# Patient Record
Sex: MALE | Race: WHITE | NOT HISPANIC OR LATINO | URBAN - METROPOLITAN AREA
[De-identification: names, ages, dates, MRNs, and addresses within clinical notes are randomized per-mention and may not be internally consistent; named-entity substitution may affect disease eponyms.]

---

## 2018-08-23 NOTE — PATIENT DISCUSSION
REVIEWED OPTION OF CONSULT AT  WITH DR OWENS - RELUCTANT TO RECOM SURGERY AS [facility] 20[facility]20 - IF HE WANTS TO PROCEED WITH CONSULT HE IS TO LET US KNOW.

## 2020-02-06 ENCOUNTER — IMPORTED ENCOUNTER (OUTPATIENT)
Dept: URBAN - METROPOLITAN AREA CLINIC 31 | Facility: CLINIC | Age: 80
End: 2020-02-06

## 2020-02-06 PROBLEM — H25.13: Noted: 2020-02-06

## 2020-02-06 PROBLEM — H43.812: Noted: 2020-02-06

## 2020-02-06 PROBLEM — H04.223: Noted: 2020-02-06

## 2020-02-06 PROCEDURE — 68801 DILATE TEAR DUCT OPENING: CPT

## 2020-02-06 PROCEDURE — 99204 OFFICE O/P NEW MOD 45 MIN: CPT

## 2020-02-06 NOTE — PATIENT DISCUSSION
1.  Epiphora OU - The patient is complaining of excess lacrimation of the right eye. Because it is moderate to severe nasolacrimal duct dilation and irrigation was performed today. Consent was obtained. This diagnostic test will determine whether the nasolacrimal duct is blocked as the etiology for the tearing. If so pt will be referred for [facility] evaluation. Ectropion including medial cicatricial. Needs skin graft - from upper eyelids. Schedule 2 months after cataract surgery2. Discussed the risks benefits alternatives and limitations of cataract surgery including infection bleeding loss of vision retinal tears detachment. The patient stated a full understanding and a desire to proceed with the procedure in both eyes. Refractive options were reviewed. Patient has elected to be optimized for distance vision in both eyes. The patient will still need glasses for reading and to possibly fine tune distance vision. OD/OS3.  PVD OS: Patient was cautioned to call our office immediately if they experience a substantial change in their symptoms such as an increase in floaters persistent flashes loss of visual field (may appear as a shadow or a curtain) or decrease in visual acuity as these may indicate a retinal tear or detachment.   If this is a new problem patient will need to return for re-examination  as determined by the physician

## 2020-05-22 ENCOUNTER — IMPORTED ENCOUNTER (OUTPATIENT)
Dept: URBAN - METROPOLITAN AREA CLINIC 31 | Facility: CLINIC | Age: 80
End: 2020-05-22

## 2020-05-22 PROBLEM — H25.13: Noted: 2020-05-22

## 2020-05-22 PROCEDURE — 92136 OPHTHALMIC BIOMETRY: CPT

## 2020-05-22 PROCEDURE — 92025 CPTRIZED CORNEAL TOPOGRAPHY: CPT

## 2020-06-02 ENCOUNTER — IMPORTED ENCOUNTER (OUTPATIENT)
Dept: URBAN - METROPOLITAN AREA CLINIC 31 | Facility: CLINIC | Age: 80
End: 2020-06-02

## 2020-06-02 PROBLEM — C69.00: Noted: 2020-06-02

## 2020-06-02 PROBLEM — Z96.1: Noted: 2020-06-02

## 2020-06-02 PROCEDURE — 99024 POSTOP FOLLOW-UP VISIT: CPT

## 2020-06-02 PROCEDURE — 92285 EXTERNAL OCULAR PHOTOGRAPHY: CPT

## 2020-06-02 NOTE — PATIENT DISCUSSION
Post-Op Day #1 - Cataract Surgery Right Eye (OD) - doing well. Tears prn. Continue postop drops as directed. Call office with symptoms of pain redness or decreased vision in operative eye. 2. 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 Right medial ectropion - epiphora. Consider repair later onFollowup on 1 week for post op exam. with Dr. Scarlet Watson. Followup on 1 week for consult. with Dr. Kathy Gaines.  Apt w/ AES and FEP to be scheduled on same day

## 2020-06-12 ENCOUNTER — IMPORTED ENCOUNTER (OUTPATIENT)
Dept: URBAN - METROPOLITAN AREA CLINIC 31 | Facility: CLINIC | Age: 80
End: 2020-06-12

## 2020-06-12 PROBLEM — Z96.1: Noted: 2020-06-12

## 2020-06-12 PROBLEM — C69.00: Noted: 2020-06-12

## 2020-06-12 PROBLEM — H11.152: Noted: 2020-06-12

## 2020-06-12 PROCEDURE — 99213 OFFICE O/P EST LOW 20 MIN: CPT

## 2020-06-12 NOTE — PATIENT DISCUSSION
1.  IRMA/SCCA: Discussed surgical excision vs topical tx with alpha interferon. Risks and benefits of surgery discussed including infection recurrence. Schedule excision of conj. lesion with [address] double freeze/thaw cryotherapy and free graft with glue od. Send tissue for pathology. 2. Pseudophakia OD - IOL stable. s/p cataract surgery with Dr Smita Engel. 3. Pinguecula OS --Reviewed that growth is caused by the cumulative effect of sun exposure over time and that it does not extend on to the cornea. Recommend UV protection to prevent progression and artificial tears prn for comfort. Return for continued monitoring.

## 2020-06-12 NOTE — PATIENT DISCUSSION
Pinguecula OS --Reviewed that growth is caused by the cumulative effect of sun exposure over time and that it does not extend on to the cornea. Recommend UV protection to prevent progression and artificial tears prn for comfort. Return for continued monitoring.

## 2020-06-16 ENCOUNTER — IMPORTED ENCOUNTER (OUTPATIENT)
Dept: URBAN - METROPOLITAN AREA CLINIC 31 | Facility: CLINIC | Age: 80
End: 2020-06-16

## 2020-06-16 PROBLEM — Z96.1: Noted: 2020-06-16

## 2020-06-16 PROCEDURE — 99024 POSTOP FOLLOW-UP VISIT: CPT

## 2020-06-16 NOTE — PATIENT DISCUSSION
Post-Op Day #1 - Cataract Surgery Left Eye (OS) - doing well. Tears prn. Continue postop drops as directed. Call office with symptoms of pain redness or decreased vision in operative eye.  1 drop of tobramycin instilledOD surgery paper work completed for excision of abnormal lesion. All questions answered.

## 2020-06-23 NOTE — PATIENT DISCUSSION
REVIEWED OPTION OF CONSULT AT  WITH DR OWENS - RELUCTANT TO RECOM SURGERY AS [facility] AND SINCE [facility]  HAS IMPROVED WITHOUT SURGERY  - IF HE WANTS TO PROCEED WITH CONSULT HE IS TO LET US KNOW.

## 2020-06-23 NOTE — PATIENT DISCUSSION
[facility]M HAS IMPROVED SINCE 8 8 2017 ON IMAGING. Internal Medicine Subjective





- Subjective


Service Date: 02/03/18


Patient seen and examined:: with staff


Patient is:: awake


Per staff patient has:: no adverse event, tolerating meds





Internal Medicine Objective





- Results


Result Diagrams: 


 02/01/18 14:27





 02/01/18 14:27


Recent Labs: 


 Laboratory Last Values











WBC  5.8 Th/cmm (4.8-10.8)   02/01/18  14:27    


 


RBC  4.94 Mil/cmm (3.80-5.80)   02/01/18  14:27    


 


Hgb  15.9 gm/dL (12-16)   02/01/18  14:27    


 


Hct  46.8 % (41.0-60)   02/01/18  14:27    


 


MCV  94.7 fl (80-99)   02/01/18  14:27    


 


MCH  32.1 pg (27.0-31.0)  H  02/01/18  14:27    


 


MCHC Differential  33.9 pg (28.0-36.0)   02/01/18  14:27    


 


RDW  13.3 % (11.5-20.0)   02/01/18  14:27    


 


Plt Count  194 Th/cmm (150-400)   02/01/18  14:27    


 


MPV  8.2 fl  02/01/18  14:27    


 


Neutrophils %  62.6 % (40.0-80.0)   02/01/18  14:27    


 


Lymphocytes %  21.6 % (20.0-50.0)   02/01/18  14:27    


 


Monocytes %  12.4 % (2.0-10.0)  H  02/01/18  14:27    


 


Eosinophils %  3.3 % (0.0-5.0)   02/01/18  14:27    


 


Basophils %  0.1 % (0.0-2.0)   02/01/18  14:27    


 


Sodium  138 mEq/L (136-145)   02/01/18  14:27    


 


Potassium  3.9 mEq/L (3.5-5.1)   02/01/18  14:27    


 


Chloride  103 mEq/L ()   02/01/18  14:27    


 


Carbon Dioxide  28.6 mEq/L (21.0-31.0)   02/01/18  14:27    


 


Anion Gap  10.3  (7.0-16.0)   02/01/18  14:27    


 


BUN  16 mg/dL (7-25)   02/01/18  14:27    


 


Creatinine  1.3 mg/dL (0.7-1.3)   02/01/18  14:27    


 


Est GFR ( Amer)  TNP   02/01/18  14:27    


 


Est GFR (Non-Af Amer)  TNP   02/01/18  14:27    


 


BUN/Creatinine Ratio  12.3   02/01/18  14:27    


 


Glucose  125 mg/dL ()  H  02/01/18  14:27    


 


Calcium  8.9 mg/dL (8.6-10.3)   02/01/18  14:27    


 


Total Bilirubin  0.7 mg/dL (0.3-1.0)   02/01/18  14:27    


 


AST  19 U/L (13-39)   02/01/18  14:27    


 


ALT  25 U/L (7-52)   02/01/18  14:27    


 


Alkaline Phosphatase  78 U/L ()   02/01/18  14:27    


 


Total Protein  5.9 gm/dL (6.0-8.3)  L  02/01/18  14:27    


 


Albumin  3.8 gm/dL (4.2-5.5)  L  02/01/18  14:27    


 


Globulin  2.1 gm/dL  02/01/18  14:27    


 


Albumin/Globulin Ratio  1.8  (1.0-1.8)   02/01/18  14:27    


 


Triglycerides  189 mg/dL (<150)  H  02/01/18  14:27    


 


Cholesterol  109 mg/dL (<200)   02/01/18  14:27    


 


LDL Cholesterol Direct  49 mg/dL ()  L  02/01/18  14:27    


 


HDL Cholesterol  45 mg/dL (23-92)   02/01/18  14:27    


 


TSH  2.55 uIU/ml (0.34-5.60)   02/01/18  14:27    


 


Urine Source  CLEAN C   02/01/18  14:27    


 


Urine Color  YELLOW   02/01/18  14:27    


 


Urine Clarity  CLEAR  (CLEAR)   02/01/18  14:27    


 


Urine pH  5.5  (4.6 - 8.0)   02/01/18  14:27    


 


Ur Specific Gravity  >= 1.030  (1.005-1.030)   02/01/18  14:27    


 


Urine Protein  NEGATIVE mg/dL (NEGATIVE)   02/01/18  14:27    


 


Urine Glucose (UA)  NEGATIVE mg/dL (NEGATIVE)   02/01/18  14:27    


 


Urine Ketones  NEGATIVE mg/dL (NEGATIVE)   02/01/18  14:27    


 


Urine Blood  NEGATIVE  (NEGATIVE)   02/01/18  14:27    


 


Urine Nitrate  NEGATIVE  (NEGATIVE)   02/01/18  14:27    


 


Urine Bilirubin  NEGATIVE  (NEGATIVE)   02/01/18  14:27    


 


Urine Urobilinogen  0.2 E.U./dL (0.2 - 1.0)   02/01/18  14:27    


 


Ur Leukocyte Esterase  NEGATIVE  (NEGATIVE)   02/01/18  14:27    


 


Urine RBC  NONE SEEN /hpf (0-5)   02/01/18  14:27    


 


Urine WBC  NONE SEEN /hpf (0-5)   02/01/18  14:27    


 


Ur Epithelial Cells  NONE SEEN /lpf (FEW)   02/01/18  14:27    


 


Urine Bacteria  NONE SEEN /hpf (NONE SEEN)   02/01/18  14:27    


 


RPR  NONREACTIVE  (NONREACTIVE)   02/01/18  14:27    














- Physical Exam


Vitals and I&O: 


 Vital Signs











Temp  97.3 F   02/02/18 16:21


 


Pulse  77   02/03/18 09:23


 


Resp  20   02/02/18 16:21


 


BP  150/89   02/03/18 09:23


 


Pulse Ox  98   02/02/18 16:21








 Intake & Output











 02/02/18 02/03/18 02/03/18





 18:59 06:59 18:59


 


Intake Total 800  


 


Balance 800  


 


Intake:   


 


  Oral 800  


 


Other:   


 


  # Voids 2  


 


  # Bowel Movements 1  











Active Medications: 


Current Medications





Acetaminophen (Tylenol)  650 mg PO Q4HR PRN


   PRN Reason: Mild Pain / Temp above 100


   Stop: 04/02/18 18:44


Al Hydrox/Mg Hydrox/Simethicone (Maalox)  30 ml PO Q4HR PRN


   PRN Reason: GI DISTRESS


   Stop: 04/02/18 18:44


Aspirin (Aspirin Chewable)  81 mg PO DAILY Replaced by [facility]


   Stop: 04/04/18 08:59


   Last Admin: 02/03/18 09:23 Dose:  81 mg


Carvedilol (Coreg)  6.25 mg PO BID Replaced by [facility]


   Stop: 04/03/18 16:59


   Last Admin: 02/03/18 09:23 Dose:  6.25 mg


Clopidogrel Bisulfate (Plavix)  75 mg PO DAILY Replaced by [facility]


   Stop: 04/04/18 08:59


   Last Admin: 02/03/18 09:23 Dose:  75 mg


Finasteride (Proscar)  5 mg PO DAILY RIKI


   PRN Reason: Protocol


   Stop: 04/04/18 08:59


   Last Admin: 02/03/18 09:23 Dose:  5 mg


Lorazepam (Ativan)  0.5 mg PO Q4HR PRN; Protocol


   PRN Reason: Anxiety


   Stop: 03/03/18 18:44


Magnesium Hydroxide (Milk Of Magnesia)  30 ml PO HS PRN


   PRN Reason: Constipation


Multivitamins/Vitamin C (Theragran)  1 tab PO DAILY RIKI


   Stop: 04/03/18 08:59


   Last Admin: 02/03/18 09:23 Dose:  1 tab


Naproxen (Naprosyn)  250 mg PO Q12H PRN


   PRN Reason: Pain (Mild)


   Stop: 04/03/18 13:19


Quetiapine Fumarate (Seroquel)  25 mg PO BID RIKI


   PRN Reason: Protocol


   Stop: 04/03/18 16:59


   Last Admin: 02/03/18 09:24 Dose:  25 mg


Simvastatin (Zocor)  10 mg PO QPM RIKI


   PRN Reason: Protocol


   Stop: 04/03/18 16:59


   Last Admin: 02/02/18 16:39 Dose:  10 mg


Tamsulosin HCl (Flomax)  0.4 mg PO DAILY RIKI


   Stop: 04/04/18 08:59


   Last Admin: 02/03/18 09:23 Dose:  0.4 mg


Temazepam (Restoril)  15 mg PO HS PRN; Protocol


   PRN Reason: Insomnia


   Stop: 04/03/18 13:19


   Last Admin: 02/02/18 21:18 Dose:  15 mg


Vitamin E (Vitamin E)  1,000 iu PO DAILY RIKI


   Stop: 04/04/18 08:59


   Last Admin: 02/03/18 09:23 Dose:  1,000 iu








General: alert


HEENT: NC/AT, PERRLA


Neck: Supple


Lungs: CTAB


Cardiovascular: RRR, Normal S1, Normal S2


Abdomen: soft, non-tender, non-distended


Neurological: alert





Internal Medicine Assmt/Plan





- Assessment


Assessment: 





suicidal ideation


hx cad


hx mi


bph





- Plan


Plan: 





fall precautions


continue current plan of care

## 2020-06-24 ENCOUNTER — IMPORTED ENCOUNTER (OUTPATIENT)
Dept: URBAN - METROPOLITAN AREA CLINIC 31 | Facility: CLINIC | Age: 80
End: 2020-06-24

## 2020-06-24 PROBLEM — Z96.1: Noted: 2020-06-24

## 2020-06-24 PROBLEM — Z98.89: Noted: 2020-06-24

## 2020-06-24 PROCEDURE — 99024 POSTOP FOLLOW-UP VISIT: CPT

## 2020-06-24 NOTE — PATIENT DISCUSSION
Post Operative: Doing well po drops as instructed tears prn. Call with any problems. No eye rubbing. Return for an appointment in 1 week for post op exam. with Dr. Jasper Torres.

## 2020-06-29 ENCOUNTER — IMPORTED ENCOUNTER (OUTPATIENT)
Dept: URBAN - METROPOLITAN AREA CLINIC 31 | Facility: CLINIC | Age: 80
End: 2020-06-29

## 2020-06-29 PROBLEM — Z96.1: Noted: 2020-06-29

## 2020-06-29 PROBLEM — Z98.89: Noted: 2020-06-29

## 2020-06-29 PROCEDURE — 99024 POSTOP FOLLOW-UP VISIT: CPT

## 2020-06-29 NOTE — PATIENT DISCUSSION
Post Operative: Doing well po drops as instructed. DC Emycin taper PRED a drop a week to off. tears prn Call with any problems. Path shows IRMA (dysplasia only)s/p Cataract surgery doing wellReturn for an appointment in 3 weeks for post op exam. MRx. with Dr. Ruchi Escoto.

## 2020-06-29 NOTE — PATIENT DISCUSSION
Post-Op Cataract Surgery 15-90 days Both Eyes (OU)-  Doing well with stable vision.   Monitor for changes

## 2020-07-17 ENCOUNTER — IMPORTED ENCOUNTER (OUTPATIENT)
Dept: URBAN - METROPOLITAN AREA CLINIC 31 | Facility: CLINIC | Age: 80
End: 2020-07-17

## 2020-07-17 PROBLEM — H02.115: Noted: 2020-07-17

## 2020-07-17 PROBLEM — H02.112: Noted: 2020-07-17

## 2020-07-17 PROBLEM — Z96.1: Noted: 2020-07-17

## 2020-07-17 PROBLEM — H43.812: Noted: 2020-07-17

## 2020-07-17 PROBLEM — Z98.89: Noted: 2020-07-17

## 2020-07-17 PROCEDURE — 99024 POSTOP FOLLOW-UP VISIT: CPT

## 2020-07-17 PROCEDURE — 92201 OPSCPY EXTND RTA DRAW UNI/BI: CPT

## 2020-07-17 NOTE — PATIENT DISCUSSION
Post-Op Cataract Surgery 15-90 days Both Eyes (OU)-  Doing well with stable vision.   Monitor for changesf/u Dr Bryanna Steward for final glasses

## 2020-07-17 NOTE — PATIENT DISCUSSION
PVD OS: Acute OS no evidence of retinal tear. Patient was cautioned to call our office immediately if they experience a substantial change in their symptoms such as an increase in floaters persistent flashes loss of visual field (may appear as a shadow or a curtain) or decrease in visual acuity as these may indicate a retinal tear or detachment.   If this is a new problem patient will need to return for re-examination  as determined by the physician

## 2020-07-17 NOTE — PATIENT DISCUSSION
Ectropion of Lower Lid OU:  The patient has an inturned right & left lower eyelid. medial with tearing. f/u Dr Luciana De in 3-4 months for repair would wait to make sure no recurrence of conjunctival lesion before proceeding.

## 2020-07-17 NOTE — PATIENT DISCUSSION
1.  Post Operative: Doing well s/p excision of IRMA OD no evidence of recurrence  tears prn Call with any problems. 2. Post-Op Cataract Surgery 15-90 days Both Eyes (OU)-  Doing well with stable vision. Monitor for changesf/u Dr Antonieta Cruz for final glasses3. PVD OS: Acute OS no evidence of retinal tear. Patient was cautioned to call our office immediately if they experience a substantial change in their symptoms such as an increase in floaters persistent flashes loss of visual field (may appear as a shadow or a curtain) or decrease in visual acuity as these may indicate a retinal tear or detachment. If this is a new problem patient will need to return for re-examination  as determined by the [employer] [facility] 10. Ectropion of Lower Lid OU:  The patient has an inturned right & left lower eyelid. medial with tearing. f/u Dr Araseli Montgomery in 3-4 months for repair would wait to make sure no recurrence of conjunctival lesion before proceeding. 5. Return for an appointment in 4 months for office call. with Dr. Yadi Malone.

## 2020-09-02 ENCOUNTER — IMPORTED ENCOUNTER (OUTPATIENT)
Dept: URBAN - METROPOLITAN AREA CLINIC 31 | Facility: CLINIC | Age: 80
End: 2020-09-02

## 2020-09-02 PROBLEM — H35.3131: Noted: 2020-09-02

## 2020-09-02 PROBLEM — H02.105: Noted: 2020-09-02

## 2020-09-02 PROBLEM — H59.032: Noted: 2020-09-02

## 2020-09-02 PROBLEM — H02.102: Noted: 2020-09-02

## 2020-09-02 PROCEDURE — 92134 CPTRZ OPH DX IMG PST SGM RTA: CPT

## 2020-09-02 PROCEDURE — 92285 EXTERNAL OCULAR PHOTOGRAPHY: CPT

## 2020-09-02 PROCEDURE — 99213 OFFICE O/P EST LOW 20 MIN: CPT

## 2020-09-02 NOTE — PATIENT DISCUSSION
Ectropion Lower Lid OU - mostly medial:  The patient has an out turned right & left lower eyelid. The patient is experiencing symptoms and/or epiphora is present. Discussed procedure at length including possibility of free skin graft. Schedule bilateral tarsal strip procedure as well as medial ectropion repair with skin graft bilaterally.

## 2020-10-07 ENCOUNTER — IMPORTED ENCOUNTER (OUTPATIENT)
Dept: URBAN - METROPOLITAN AREA CLINIC 31 | Facility: CLINIC | Age: 80
End: 2020-10-07

## 2020-10-07 PROBLEM — Z98.89: Noted: 2020-10-07

## 2020-10-07 PROCEDURE — 99024 POSTOP FOLLOW-UP VISIT: CPT

## 2020-10-07 NOTE — PATIENT DISCUSSION
1.  Post Operative: 1 week S/p bilateral ectropion repair with skin graft. still residual medial punctal ectropion but no epiphora. Doing well may need touch up procedure LLL if tearing persists. Po drops as instructed tears prn. Call with any problems. 2. Return for an appointment in 2 weeks for post op exam. with Dr. Pino Bryant.

## 2020-10-21 ENCOUNTER — IMPORTED ENCOUNTER (OUTPATIENT)
Dept: URBAN - METROPOLITAN AREA CLINIC 31 | Facility: CLINIC | Age: 80
End: 2020-10-21

## 2020-10-21 PROBLEM — Z98.89: Noted: 2020-10-21

## 2020-10-21 PROCEDURE — 92134 CPTRZ OPH DX IMG PST SGM RTA: CPT

## 2020-10-21 PROCEDURE — 99024 POSTOP FOLLOW-UP VISIT: CPT

## 2020-10-21 NOTE — PATIENT DISCUSSION
Post Operative  9/30/2020 Bilateral ECTROPION REPAIR (-21 DAYS POSTOP) with a graft:  Doing well DO NOT RUB EYES po drops as instructed tears prn. Call with any problems. mild residual medial punctal ectropion - no symptoms. 2. S/p IRMA of conj - treated by Leora Couch. Bilateral Cataract surgeryS/p CME OU - resolved.  OCT today- F/u with Dr Cass Enamorado in 3 months

## 2020-11-25 ENCOUNTER — IMPORTED ENCOUNTER (OUTPATIENT)
Dept: URBAN - METROPOLITAN AREA CLINIC 31 | Facility: CLINIC | Age: 80
End: 2020-11-25

## 2020-11-25 PROBLEM — C69.00: Noted: 2020-11-25

## 2020-11-25 PROBLEM — Z96.1: Noted: 2020-11-25

## 2020-11-25 PROBLEM — Z98.89: Noted: 2020-11-25

## 2020-11-25 PROBLEM — H04.563: Noted: 2020-11-25

## 2020-11-25 PROCEDURE — 99024 POSTOP FOLLOW-UP VISIT: CPT

## 2020-11-25 NOTE — PATIENT DISCUSSION
IRMA/SCCA: s/p excision no recurrence monitorReturn for an appointment in 4 months for office call. with Dr. Christina Alcazar

## 2020-11-25 NOTE — PATIENT DISCUSSION
1.  Post Operative:  s/p ectropion repair still with tearing very small puncta refer to Dr Reanna Chanel Call with any problems. 2. Stenosis of bilateral punta3. Pseudophakia OU - IOLs stable. Monitor for changes in vision. 4.  IRMA/SCCA: s/p excision no recurrence monitorReturn for an appointment in 4 months for office call. with Dr. Unruly Sanchez.

## 2021-09-01 ENCOUNTER — OFFICE VISIT (OUTPATIENT)
Age: 81
End: 2021-09-01

## 2021-11-09 ENCOUNTER — IMPORTED ENCOUNTER (OUTPATIENT)
Dept: URBAN - METROPOLITAN AREA CLINIC 31 | Facility: CLINIC | Age: 81
End: 2021-11-09

## 2021-11-09 PROBLEM — H02.115: Noted: 2021-11-09

## 2021-11-09 PROBLEM — C69.00: Noted: 2021-11-09

## 2021-11-09 PROBLEM — H02.112: Noted: 2021-11-09

## 2021-11-09 PROBLEM — H10.403: Noted: 2021-11-09

## 2021-11-09 PROBLEM — H04.121: Noted: 2021-11-09

## 2021-11-09 PROBLEM — Z96.1: Noted: 2021-11-09

## 2021-11-09 PROCEDURE — 99213 OFFICE O/P EST LOW 20 MIN: CPT

## 2021-11-09 NOTE — PATIENT DISCUSSION
1.  IRMA/SCCA:  recurrence pt did not return for f/u as recommended. Options include topical 5FU vs surgical excision. Topical interferon no longer available. Plan punctal plugs today  If pt does not taste drops going down then safe to use topical 5FU 4x/d for a week then stop. recheck 1 week after completing course. Will need drops compounded at prescription shop. 2. Pseudophakia OU - IOLs stable. Monitor for changes in vision. 3.  Ectropion of Lower Lid OU:  s/p repair by Dr Armando Alejandre pt still complains of tearing ou. 4. Risks and benefits of punctal plugs reviewed with patient. Recommend punctal plugs and continued topical therapy5. Allergic Conjunctivitis OU -- The condition was  discussed with the patient. Avoidance of allergens and cool compresses were recommended. Bepreve 2x/d. Return for an appointment in 2 weeks for office call. with Dr. Tung Rice.

## 2021-11-09 NOTE — PATIENT DISCUSSION
Risks and benefits of punctal plugs reviewed with patient.    Recommend punctal plugs and continued topical therapy

## 2021-11-09 NOTE — PATIENT DISCUSSION
Allergic Conjunctivitis OU -- The condition was  discussed with the patient. Avoidance of allergens and cool compresses were recommended. Bepreve 2x/d.

## 2021-11-17 ENCOUNTER — IMPORTED ENCOUNTER (OUTPATIENT)
Dept: URBAN - METROPOLITAN AREA CLINIC 31 | Facility: CLINIC | Age: 81
End: 2021-11-17

## 2021-11-17 PROBLEM — Z96.1: Noted: 2021-11-17

## 2021-11-17 PROBLEM — H04.121: Noted: 2021-11-17

## 2021-11-17 PROBLEM — H10.403: Noted: 2021-11-17

## 2021-11-17 PROBLEM — H02.112: Noted: 2021-11-17

## 2021-11-17 PROBLEM — H02.115: Noted: 2021-11-17

## 2021-11-17 PROBLEM — C69.00: Noted: 2021-11-17

## 2021-11-17 PROCEDURE — 99213 OFFICE O/P EST LOW 20 MIN: CPT

## 2021-11-17 NOTE — PATIENT DISCUSSION
1.  IRMA/SCCA:  recurrence pt did not return for f/u as recommended. Lower puncta not occludable pt tasting allergy drops when used. Would avoid topical 5FU. Risks and benefits of exicison of recurrent OSSN discussed. Schedule excision with [address] double freeze thaw cryotherapy and AMT od.2. Pseudophakia OU - IOLs stable. Monitor for changes in vision. 3.  Ectropion of Lower Lid OU:  s/p repair by Dr Eric Richards pt still complains of tearing ou. 4. Risks and benefits of punctal plugs reviewed with patient. Recommend punctal plugs and continued topical therapy5. Allergic Conjunctivitis OU -- The condition was  discussed with the patient. Avoidance of allergens and cool compresses were recommended. Bepreve 2x/d.

## 2021-12-01 ENCOUNTER — IMPORTED ENCOUNTER (OUTPATIENT)
Dept: URBAN - METROPOLITAN AREA CLINIC 31 | Facility: CLINIC | Age: 81
End: 2021-12-01

## 2021-12-01 PROBLEM — Z98.89: Noted: 2021-12-01

## 2021-12-01 PROCEDURE — 99024 POSTOP FOLLOW-UP VISIT: CPT

## 2021-12-01 NOTE — PATIENT DISCUSSION
1.  Post Operative: OD CONJ. LESION EXCISIONAL BIOPSY  W/ MMC/DOUBLE FREEZE CRYO AMT 11/30/2021 (-1 DAY POST OP): Doing well po drops as instructed. tears prn discussed waiting on pathology reportCall with any problems. No rubbing eye wear shield x1 wk.2. Followup on 1 week for post op exam. with Dr. Fer Caraballo.

## 2021-12-08 ENCOUNTER — IMPORTED ENCOUNTER (OUTPATIENT)
Dept: URBAN - METROPOLITAN AREA CLINIC 31 | Facility: CLINIC | Age: 81
End: 2021-12-08

## 2021-12-08 PROBLEM — Z98.89: Noted: 2021-12-08

## 2021-12-08 PROCEDURE — 99024 POSTOP FOLLOW-UP VISIT: CPT

## 2021-12-08 NOTE — PATIENT DISCUSSION
Post Operative: PATH shows severe IRMA excised. Doing well po drops as instructed. DC emycin pred 2x/d for 2 weeks then qd tears prn Call with any problems. NO eye rubbingReturn for an appointment for post op exam. 3-4 weeks with Dr. Hoa Paulson.

## 2022-01-04 ENCOUNTER — IMPORTED ENCOUNTER (OUTPATIENT)
Dept: URBAN - METROPOLITAN AREA CLINIC 31 | Facility: CLINIC | Age: 82
End: 2022-01-04

## 2022-01-04 PROBLEM — Z98.89: Noted: 2022-01-04

## 2022-01-04 PROCEDURE — 99024 POSTOP FOLLOW-UP VISIT: CPT

## 2022-01-04 NOTE — PATIENT DISCUSSION
Post Operative: Doing well s/p excision of severe IRMA OD po drops as instructed. Pred qd for 10 days then dc can use prn irritation tears prn Call with any problems. Discussed risk of recurrence importance of f/u and close monitoringReturn for an appointment in 2 months for office call. with Dr. Dania Fitzgerald.

## 2022-03-01 ENCOUNTER — IMPORTED ENCOUNTER (OUTPATIENT)
Dept: URBAN - METROPOLITAN AREA CLINIC 31 | Facility: CLINIC | Age: 82
End: 2022-03-01

## 2022-03-01 PROBLEM — C69.00: Noted: 2022-03-01

## 2022-03-01 PROBLEM — Z96.1: Noted: 2022-03-01

## 2022-03-01 PROCEDURE — 99213 OFFICE O/P EST LOW 20 MIN: CPT

## 2022-04-02 ASSESSMENT — VISUAL ACUITY
OS_GLARE: 20/50-2MED
OD_CC: 20/70
OD_SC: 20/20-1
OD_SC: 20/25-2
OS_SC: 20/40-2
OS_SC: 20/20
OD_CC: 20/50+2
OU_SC: 20/20
OD_SC: 20/25
OS_CC: 20/400
OD_SC: 20/30
OD_CC: 20/100
OD_GLARE: 20/50MED
OS_SC: 20/70-1
OS_CC: 20/30-2
OS_CC: 20/50
OD_SC: 20/25-3
OS_SC: 20/20-2
OS_SC: 20/25-2
OD_CC: 20/40
OD_SC: 20/70
OS_SC: 20/30
OS_SC: 20/25-2
OD_SC: 20/25-2
OS_PH: SC 20/30 +2
OS_PH: SC 20/30
OS_SC: 20/30+2
OD_PH: SC 20/25 -2
OS_SC: 20/25
OS_SC: 20/25-1
OD_CC: 20/80
OD_CC: 20/80
OD_SC: 20/20
OS_SC: 20/25-1
OS_CC: 20/40-2
OD_CC: 20/30+2
OS_CC: 20/50-1
OD_SC: 20/30
OD_PH: SC 20/40
OD_PH: SC 20/50 -1
OD_SC: 20/30-1
OS_CC: 20/40
OD_CC: 20/40

## 2022-04-02 ASSESSMENT — TONOMETRY
OS_IOP_MMHG: 16
OD_IOP_MMHG: 13
OD_IOP_MMHG: 18
OS_IOP_MMHG: 20
OS_IOP_MMHG: 14
OD_IOP_MMHG: 20
OD_IOP_MMHG: 18
OD_IOP_MMHG: 18
OD_IOP_MMHG: 12
OD_IOP_MMHG: 16
OS_IOP_MMHG: 16
OD_IOP_MMHG: 13
OS_IOP_MMHG: 15
OD_IOP_MMHG: 16
OS_IOP_MMHG: 13
OS_IOP_MMHG: 18
OS_IOP_MMHG: 17
OS_IOP_MMHG: 30
OD_IOP_MMHG: 13

## 2022-05-11 ENCOUNTER — OFFICE VISIT (OUTPATIENT)
Dept: URBAN - METROPOLITAN AREA CLINIC 9 | Facility: CLINIC | Age: 82
End: 2022-05-11

## 2022-05-13 ENCOUNTER — FOLLOW UP (OUTPATIENT)
Dept: URBAN - METROPOLITAN AREA CLINIC 29 | Facility: CLINIC | Age: 82
End: 2022-05-13

## 2022-05-13 DIAGNOSIS — C69.00: ICD-10-CM

## 2022-05-13 DIAGNOSIS — Z96.1: ICD-10-CM

## 2022-05-13 DIAGNOSIS — H17.9: ICD-10-CM

## 2022-05-13 PROCEDURE — 99213 OFFICE O/P EST LOW 20 MIN: CPT

## 2022-05-13 ASSESSMENT — VISUAL ACUITY
OD_CC: 20/25-1
OS_CC: 20/25-1

## 2022-06-01 ENCOUNTER — LAB OUTSIDE AN ENCOUNTER (OUTPATIENT)
Age: 82
End: 2022-06-01

## 2022-06-01 LAB
FOLATE, SERUM: (no result)
INR: 1.1
PARTIAL THROMBOPLASTIN TIME, ACTIVATED: (no result)
PT: (no result)
VITAMIN A (RETINOL): (no result)
VITAMIN B12: (no result)
VITAMIN D, 1,25 (OH)2, TOTAL: (no result)
VITAMIN D,25-OH,TOTAL,IA: (no result)
VITAMIN D2, 1,25 (OH)2: (no result)
VITAMIN D3, 1,25 (OH)2: (no result)
VITAMIN K: (no result)

## 2022-06-03 ENCOUNTER — TELEPHONE ENCOUNTER (OUTPATIENT)
Dept: URBAN - METROPOLITAN AREA CLINIC 9 | Facility: CLINIC | Age: 82
End: 2022-06-03

## 2022-06-05 ENCOUNTER — TELEPHONE ENCOUNTER (OUTPATIENT)
Dept: URBAN - METROPOLITAN AREA CLINIC 9 | Facility: CLINIC | Age: 82
End: 2022-06-05

## 2022-06-22 ENCOUNTER — TELEPHONE ENCOUNTER (OUTPATIENT)
Dept: URBAN - METROPOLITAN AREA CLINIC 9 | Facility: CLINIC | Age: 82
End: 2022-06-22

## 2022-06-28 ENCOUNTER — OFFICE VISIT (OUTPATIENT)
Dept: URBAN - METROPOLITAN AREA SURGERY CENTER 9 | Facility: SURGERY CENTER | Age: 82
End: 2022-06-28

## 2022-07-21 ENCOUNTER — OFFICE VISIT (OUTPATIENT)
Dept: URBAN - METROPOLITAN AREA CLINIC 9 | Facility: CLINIC | Age: 82
End: 2022-07-21

## 2022-07-30 ENCOUNTER — TELEPHONE ENCOUNTER (OUTPATIENT)
Age: 82
End: 2022-07-30

## 2022-07-31 ENCOUNTER — TELEPHONE ENCOUNTER (OUTPATIENT)
Age: 82
End: 2022-07-31

## 2022-07-31 RX ORDER — ERGOCALCIFEROL 1.25 MG/1
1 (ONE) CAPSULE ORAL WEEKLY
Qty: 0 | Refills: 2 | Status: ACTIVE | COMMUNITY
Start: 2022-06-03

## 2022-07-31 RX ORDER — ERGOCALCIFEROL 1.25 MG/1
1 (ONE) CAPSULE ORAL WEEKLY
Qty: 0 | Refills: 2 | Status: ACTIVE | COMMUNITY
Start: 2022-06-05

## 2022-10-12 ENCOUNTER — FOLLOW UP (OUTPATIENT)
Dept: URBAN - METROPOLITAN AREA CLINIC 29 | Facility: CLINIC | Age: 82
End: 2022-10-12

## 2022-10-12 DIAGNOSIS — Z96.1: ICD-10-CM

## 2022-10-12 DIAGNOSIS — C69.00: ICD-10-CM

## 2022-10-12 PROCEDURE — 92012 INTRM OPH EXAM EST PATIENT: CPT

## 2022-10-12 PROCEDURE — 68200 TREAT EYELID BY INJECTION: CPT

## 2022-10-12 ASSESSMENT — VISUAL ACUITY
OD_CC: 20/25-3
OS_CC: 20/25+1

## 2022-10-12 NOTE — PATIENT DISCUSSION
Small area - possible recurrence, Because pt has had surgery for tearing his puncta cannot be closed off.  Not a candidate for topical 5FU.  Risks and benefits reviewed plan subconjunctival 5FU injection today.  tobradex drops  3x/d for a week.

## 2022-10-12 NOTE — PROCEDURE NOTE: CLINICAL
PROCEDURE NOTE: 5-FU Injection #1 OD. Diagnosis: Malignant Neoplasm of Conjunctiva. Prior to treatment, the risks/benefits/alternatives were discussed. The patient wished to proceed with procedure. Informed consent was obtained. A time out to confirm the patient, site, and procedure has been achieved. The site has been marked. A 0.1 ml subconjunctival injection of 50 mg/ml 5-FU was given with a 30-gauge needle. Patient tolerated procedure well. There were no complications. Post procedure instructions given. antibiotic drop instilled.

## 2022-11-02 ENCOUNTER — FOLLOW UP (OUTPATIENT)
Dept: URBAN - METROPOLITAN AREA CLINIC 29 | Facility: CLINIC | Age: 82
End: 2022-11-02

## 2022-11-02 DIAGNOSIS — C69.00: ICD-10-CM

## 2022-11-02 DIAGNOSIS — H02.105: ICD-10-CM

## 2022-11-02 DIAGNOSIS — H02.102: ICD-10-CM

## 2022-11-02 DIAGNOSIS — Z96.1: ICD-10-CM

## 2022-11-02 PROCEDURE — 99213 OFFICE O/P EST LOW 20 MIN: CPT

## 2022-11-02 ASSESSMENT — VISUAL ACUITY
OD_CC: 20/50-1
OS_CC: 20/40+2

## 2022-11-02 NOTE — PATIENT DISCUSSION
s/p 5FU injection doing better. Because pt has had surgery for tearing his puncta cannot be closed off.  Not a candidate for topical 5FU.